# Patient Record
Sex: FEMALE | Race: WHITE
[De-identification: names, ages, dates, MRNs, and addresses within clinical notes are randomized per-mention and may not be internally consistent; named-entity substitution may affect disease eponyms.]

---

## 2018-08-05 ENCOUNTER — HOSPITAL ENCOUNTER (EMERGENCY)
Dept: HOSPITAL 45 - C.EDB | Age: 18
Discharge: HOME | End: 2018-08-05
Payer: COMMERCIAL

## 2018-08-05 VITALS — DIASTOLIC BLOOD PRESSURE: 75 MMHG | SYSTOLIC BLOOD PRESSURE: 106 MMHG | OXYGEN SATURATION: 96 % | HEART RATE: 98 BPM

## 2018-08-05 VITALS
WEIGHT: 119.27 LBS | HEIGHT: 64.02 IN | BODY MASS INDEX: 20.36 KG/M2 | WEIGHT: 119.27 LBS | BODY MASS INDEX: 20.36 KG/M2 | HEIGHT: 64.02 IN

## 2018-08-05 VITALS — TEMPERATURE: 98.06 F

## 2018-08-05 DIAGNOSIS — Z79.899: ICD-10-CM

## 2018-08-05 DIAGNOSIS — S61.452A: ICD-10-CM
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
## 2018-08-05 DIAGNOSIS — S61.051A: Primary | ICD-10-CM

## 2018-08-05 DIAGNOSIS — J45.990: ICD-10-CM

## 2018-08-05 NOTE — DIAGNOSTIC IMAGING REPORT
R HAND MIN 3 VIEWS ROUTINE



CLINICAL HISTORY: dog bite R hand; 1st MCP and IP joint pain    



COMPARISON: None.



DISCUSSION: The bones and joint spaces appear intact. There is no evidence of

fracture, dislocation or bony disease. There is no evidence for soft tissue

swelling.



IMPRESSION: Negative study.











The above report was generated using voice recognition software.  It may contain

grammatical, syntax or spelling errors.







Electronically signed by:  Nadir Shelton M.D.

8/5/2018 6:57 PM



Dictated Date/Time:  8/5/2018 6:56 PM

## 2018-08-05 NOTE — EMERGENCY ROOM VISIT NOTE
ED Visit Note


First contact with patient:  18:28


Chief Complaint: Dog bite to right hand.





History of Present Illness: Ms. Mckinnon is a 17-year-old white female who 

ambulates into the ED accompanied by her father complaining of a dog bite to 

the right thumb area and left dorsal hand.


Patient reports she tried to intervene between a neighbor's pit ball and her 

family dog.  She reports the pitbull tied up on the neighbors property and got 

loose.  He ran over to the patient's property and started attacking the family 

dog.


During the intervention patient reports she was bit on the right hand in the 

area of the thumb and the posterior left hand.  She did control bleeding prior 

to arrival at the hospital.


Associated with her wound she reports she has a throbbing pain in the area of 

the right thumb; predominantly over the thenar eminence and the first MCP 

joint.  She rates her discomfort 2/10.  Her pain is nonradiating.  Her pain 

worsens with palpation of the thenar eminence, her wounds in the area of the 

first MCP joint and extension of the thumb.  She has not identified any 

alleviating factors related to the pain.  She has not taken any medication for 

pain prior to arrival at the hospital.  She denies any associated wrist pain, 

other hand pain, finger weakness/numbness/tingling.


She reports there are no pain or other symptoms in the area of the wound on her 

left hand.





Review of Systems: As noted above in history of present illness. 





Past Medical History: Sports induced asthma, ear infections, status post wisdom 

teeth extraction.


Current Medications: Claritin-D.


Allergies to Medications: Patient denies.


Social History: Patient is not employed; she lives with her parents and feels 

safe in her home environment; she denies tobacco and alcohol use.





Physical Examination:


Vital Signs: 








  Date Time  Temp Pulse Resp B/P (MAP) Pulse Ox O2 Delivery O2 Flow Rate FiO2


 


8/5/18 19:56  98 20 106/75 96 Room Air  


 


8/5/18 18:25 36.7 99 20 107/71 97 Room Air  





GENERAL: 17-year-old female in mild distress due to pain, nontoxic-appearing, 

afebrile and hemodynamically stable.


NEUROLOGICAL: Awake, alert and oriented to person, place and time.  Answering 

questions appropriately and following commands.  Normal gait.  


SKIN: Warm, dry and pink.  Right Hand: Patient has forward different soft 

tissue injury over the dorsal aspect of the thumb in the area of the first MCP 

joint.  2 of these wounds are subcentimeter superficial skin avulsions without 

active bleeding.  The first wound is just inferior to the interphalangeal joint 

on the dorsal aspect of the thumb and measures approximately 0.7 cm.  This does 

appear full-thickness and is minimally bleeding.  The last wound is a 

combination of a puncture wound and a skin avulsion.  This is just inferior to 

the MCP joint.  It is full-thickness and there is exposure of the subcutaneous 

fat.  This is associated with an approximately 1 cm skin avulsion.  This wound 

is also actively bleeding.  Left Hand: Over the dorsal aspect of the hand in 

the area of the mid second metacarpal patient has a 0.4 cm superficial puncture 

wound/laceration.  No active bleeding.


RIGHT HAND: Soft tissue injuries as noted above under SKIN.  No gross bony 

deformity.  Moderate tenderness throughout the area of the bite with mild 

swelling.  There was some mild tenderness over the extensor pollicis longus but 

it felt like it was intact and she had good muscle strength and movement.  

Throughout the thumb the skin was warm and pink and capillary refill was brisk.


LEFT HAND: Soft tissue injury as noted above.  No gross bony deformity.  No 

tenderness throughout the hand including over the puncture wound/laceration.  

No swelling, erythema, bony deformity or crepitus.  Throughout the hand the 

skin was warm and pink and capillary refill was brisk.  She is able to 

distinguish light sensations to all dermatomes of the hand.





ED Course:


Patient is assessed as noted above.


Patient's medication list was reviewed.


Patient was offered pain medication and refused.


Right Hand X-Rays: Were read by myself and the radiologist showing no acute 

fractures or dislocations.  Mild soft tissue swelling.  No foreign bodies.


Wound Cleaning/Repair Right Hand: 


Complexity: Basic:


Verbal consent was obtained after the risks and benefits were explained.


The skin was prepped with betadine and a sterile field set.  


The wound was explored for foreign bodies and none found.  


Copious irrigation was performed using sterile saline.  


With direct pressure the bleeding subsided. 


Debridement was not performed.  


The wound edges were approximated using Steri-Strips


Hemostasis and excellent approximation was achieved.  


Sterile dressing and a thumb lacer splint was applied.  


No complications and the patient tolerated the procedure well.


Patient's left hand wound was cleansed with antibacterial soap and water and 

covered with a bacitracin dressing.


During her stay in the emergency department we received notification from the 

investigating police department who reported that the pimples rabies 

immunization status was up-to-date.


Patient was educated about tonight's findings and instructed on his treatment 

plan; he verbalizes understanding and agreement with this plan.





Clinical Impression: Dog bite right and left hand.





Disposition: Patient discharged home in stable condition accompanied by her 

father; prior to departure she was reassessed and subjectively reported her 

pain was at a level of 3/10.





Plan:


Comfort measures, wound care and signs of infection were discussed with the 

patient and her father.


Patient was prescribed Augmentin 875 mg 2 times a day for 7 days.


Patient was encouraged to follow-up with PCP or return to the ED for any signs 

of infection, uncontrolled pain or any new/concerning symptoms.